# Patient Record
Sex: MALE
[De-identification: names, ages, dates, MRNs, and addresses within clinical notes are randomized per-mention and may not be internally consistent; named-entity substitution may affect disease eponyms.]

---

## 2021-11-02 ENCOUNTER — NON-APPOINTMENT (OUTPATIENT)
Age: 21
End: 2021-11-02

## 2021-11-02 PROBLEM — Z00.00 ENCOUNTER FOR PREVENTIVE HEALTH EXAMINATION: Status: ACTIVE | Noted: 2021-11-02

## 2022-07-25 ENCOUNTER — APPOINTMENT (OUTPATIENT)
Dept: ORTHOPEDIC SURGERY | Facility: CLINIC | Age: 22
End: 2022-07-25

## 2022-07-25 DIAGNOSIS — S83.8X1A SPRAIN OF OTHER SPECIFIED PARTS OF RIGHT KNEE, INITIAL ENCOUNTER: ICD-10-CM

## 2022-07-25 PROCEDURE — 99203 OFFICE O/P NEW LOW 30 MIN: CPT

## 2022-07-25 PROCEDURE — 73562 X-RAY EXAM OF KNEE 3: CPT | Mod: RT

## 2022-07-28 PROBLEM — S83.8X1A SPRAIN OF OTHER LIGAMENT OF RIGHT KNEE, INITIAL ENCOUNTER: Status: ACTIVE | Noted: 2022-07-28

## 2022-07-28 NOTE — PHYSICAL EXAM
[de-identified] : Oriented to time, place, person\par Mood: Normal\par Affect: Normal\par Appearance: Healthy, well appearing, no acute distress.\par Gait: Normal\par Assistive Devices: None\par \par Right Knee Exam:\par \par Skin: Clean, dry, intact\par Inspection: No obvious malalignment, no masses, no swelling, no effusion\par Pulses: 2+ DP/PT pulses \par ROM: 0-140 degrees of flexion. No pain with deep knee flexion/extension.\par Tenderness: No MJLT. No LJLT. No pain over the patella facets. No pain to the quadriceps tendon. No pain to the patella tendon. No posterior knee tenderness.\par Stability: Stable to varus, valgus. IA Lachman testing. Negative anterior drawer, negative posterior drawer.\par Strength: 5/5 Q/H/TA/GS/EHL, without atrophy\par Neuro: Intact to light touch throughout, DTRs normal\par Additional Tests: Negative Latha's test, Negative patellar grind test  [de-identified] : The following radiographs were ordered and read by me during this patients visit. I reviewed each radiograph in detail with the patient and discussed the findings as highlighted below. \par \par 4 views of the right knee were obtained today, 07/25/2022, that show no acute fracture or dislocation. There is no medial, no lateral and no patellofemoral degenerative changes seen. There is no significant malalignment. No significant other obvious osseous abnormality, otherwise unremarkable.

## 2022-07-28 NOTE — ADDENDUM
[FreeTextEntry1] : This note was written by Lorena Alexandre on 07/25/2022 acting solely as a scribe for Dr. Toy Walters.\par \par All medical record entries made by the Scribe were at my, Dr. Toy Walters, direction and personally dictated by me on 07/25/2022. I have personally reviewed the chart and agree that the record accurately reflects my personal performance of the history, physical exam, assessment and plan.

## 2022-07-28 NOTE — DISCUSSION/SUMMARY
[de-identified] : 21 y/o male with right knee hyperextension injury\par \par Patient presents with right knee evaluation s/p hyperextension episode.  The patient reports minimal to no pain on clinical examination, and has full range of motion without any discomfort.  Clinically his ligamentous structures appear intact with no laxity to Lachman testing as well as no collateral ligament dysfunction.  Considering benign clinical examination and full return to impact loading exercise without pain recommendations at this time are for continued observation and ATC treatments.  \par \par I do not believe that he warrants any further imaging at this time, and may discuss this with his  as needed.  This was discussed with the ATC who agrees with plan.  \par \par Recommendations: Full activity with no restrictions.  Ice/NSAIDs as needed if symptoms persist follow-up for consideration of advanced imaging.\par \par Follow-up as needed

## 2022-07-28 NOTE — HISTORY OF PRESENT ILLNESS
[de-identified] : 22 year old male [employer] presents today with right knee injury 7/21/22. He hyperextended his knee playing basketball felt a "pop". He was unable to put weight on the leg after the injury. States that pain has resolved, and has returned to full basketball activities without pain, and is here for check up. Per ATC, he is able to single leg squat and complete practices with no issues. Denies pain, instability, catching, locking. \par \par The patient's past medical history, past surgical history, medications and allergies were reviewed by me today with the patient and documented accordingly. In addition, the patient's family and social history, which were noncontributory to this visit, were reviewed also.

## 2022-12-01 ENCOUNTER — APPOINTMENT (OUTPATIENT)
Dept: ORTHOPEDIC SURGERY | Facility: CLINIC | Age: 22
End: 2022-12-01

## 2022-12-01 ENCOUNTER — APPOINTMENT (OUTPATIENT)
Dept: MRI IMAGING | Facility: CLINIC | Age: 22
End: 2022-12-01

## 2022-12-01 ENCOUNTER — OUTPATIENT (OUTPATIENT)
Dept: OUTPATIENT SERVICES | Facility: HOSPITAL | Age: 22
LOS: 1 days | End: 2022-12-01
Payer: COMMERCIAL

## 2022-12-01 DIAGNOSIS — S93.401A SPRAIN OF UNSPECIFIED LIGAMENT OF RIGHT ANKLE, INITIAL ENCOUNTER: ICD-10-CM

## 2022-12-01 DIAGNOSIS — Z00.00 ENCOUNTER FOR GENERAL ADULT MEDICAL EXAMINATION WITHOUT ABNORMAL FINDINGS: ICD-10-CM

## 2022-12-01 PROCEDURE — 73610 X-RAY EXAM OF ANKLE: CPT | Mod: RT

## 2022-12-01 PROCEDURE — 73721 MRI JNT OF LWR EXTRE W/O DYE: CPT

## 2022-12-01 PROCEDURE — 99214 OFFICE O/P EST MOD 30 MIN: CPT

## 2022-12-01 PROCEDURE — 73721 MRI JNT OF LWR EXTRE W/O DYE: CPT | Mod: 26,RT

## 2022-12-01 RX ORDER — IBUPROFEN 600 MG/1
600 TABLET, FILM COATED ORAL 3 TIMES DAILY
Qty: 45 | Refills: 1 | Status: ACTIVE | COMMUNITY
Start: 2022-12-01 | End: 1900-01-01

## 2022-12-01 NOTE — PHYSICAL EXAM
[de-identified] : Oriented to time, place, person\par Mood: Normal\par Affect: Normal\par Appearance: Healthy, well appearing, no acute distress.\par Gait: Antalgic\par Assistive Devices: CAM boot\par \par Right Ankle exam:\par \par Skin: Clean, dry, intact\par Inspection: No obvious malalignment, + lateral swelling, no effusion, +ecchymosis to the lateral ankle. \par Pulses: 2+ DP/PT pulses \par ROM: 0 degrees of dorsiflexion, 10 degrees of plantarflexion -pain with gross motion\par Tenderness: No tenderness over the lateral malleolus, +CFL/ATFL/PTFL pain. No medial malleolus pain, + deltoid ligament pain. No proximal fibular pain. No heel pain.  No pain of his syndesmosis\par Stability: Stable\par Strength: Intact TA/GS/EHL\par Neuro: In tact to light touch throughout\par Additional tests: Negative syndesmosis squeeze test.  [de-identified] : The following radiographs were ordered and read by me during this patients visit. I reviewed each radiograph in detail with the patient and discussed the findings as highlighted below. \par \par 3 views of the right ankle were obtained today, 12/01/2022, that show no acute fracture or dislocation. There is no significant degenerative change seen. There is no gross malalignment. Ankle mortise is intact. Os trigonom.

## 2022-12-01 NOTE — DISCUSSION/SUMMARY
[de-identified] : 23 y/o male right ankle sprain\par \par The patient presents with an acute inversion injury to the right ankle with an injury to the lateral ligamentous complex. Clinically, patient has pain over the deltoid ligament as well as the lateral ligamentous complex. Xray shows no evidence of internal derangement or fracture.  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 \par \par MRI Rx given to patient to further delineate any internal structural derangement to the ankle. This will allow for better understanding of the severity of disease, and allow for better stratification of treatment strategies.\par \par Recommendation: ATC treatments, PT eval and treatment.  Recommendation is for period of relative rest & avoidance of painful activity, ice to the area 2x daily for 20minutes, and OTC tylenol/NSAID's as needed.. Obtain MRI. \par \par Followup: After MRI

## 2022-12-01 NOTE — ADDENDUM
[FreeTextEntry1] : This note was written by Lorena Alexandre on 12/01/2022 acting solely as a scribe for Dr. Toy Walters.\par \par All medical record entries made by the Scribe were at my, Dr. Toy Walters, direction and personally dictated by me on 12/01/2022. I have personally reviewed the chart and agree that the record accurately reflects my personal performance of the history, physical exam, assessment and plan.

## 2022-12-01 NOTE — HISTORY OF PRESENT ILLNESS
[de-identified] : 22 year old male [employer]  presents today with right ankle injury x 1 day. Patient was playing basketball rolled his ankle over another players foot. He was able to play for a short time after the injury and take some foul throws, but ultimately exited the game.  He presents in CAM boot. The pain is constant worse with walking. He is not taking pain medication. Denies numbness or tingling. This is the first injury to the ankle.  He has been undergoing ATC treatments.

## 2023-02-04 ENCOUNTER — NON-APPOINTMENT (OUTPATIENT)
Age: 23
End: 2023-02-04

## 2023-02-04 DIAGNOSIS — R11.2 NAUSEA WITH VOMITING, UNSPECIFIED: ICD-10-CM

## 2023-02-04 RX ORDER — ONDANSETRON 4 MG/1
4 TABLET, ORALLY DISINTEGRATING ORAL EVERY 8 HOURS
Qty: 21 | Refills: 0 | Status: ACTIVE | COMMUNITY
Start: 2023-02-04 | End: 1900-01-01